# Patient Record
Sex: MALE | Race: WHITE
[De-identification: names, ages, dates, MRNs, and addresses within clinical notes are randomized per-mention and may not be internally consistent; named-entity substitution may affect disease eponyms.]

---

## 2018-04-24 ENCOUNTER — HOSPITAL ENCOUNTER (OUTPATIENT)
Dept: HOSPITAL 17 - PHSDC | Age: 43
Discharge: HOME | End: 2018-04-24
Attending: SURGERY
Payer: COMMERCIAL

## 2018-04-24 VITALS
SYSTOLIC BLOOD PRESSURE: 98 MMHG | OXYGEN SATURATION: 97 % | RESPIRATION RATE: 14 BRPM | DIASTOLIC BLOOD PRESSURE: 77 MMHG | HEART RATE: 103 BPM

## 2018-04-24 VITALS — HEART RATE: 89 BPM | TEMPERATURE: 98.1 F

## 2018-04-24 VITALS — HEART RATE: 117 BPM

## 2018-04-24 VITALS — WEIGHT: 220.46 LBS | BODY MASS INDEX: 25.51 KG/M2 | HEIGHT: 78 IN

## 2018-04-24 DIAGNOSIS — I10: ICD-10-CM

## 2018-04-24 DIAGNOSIS — D17.1: Primary | ICD-10-CM

## 2018-04-24 PROCEDURE — 88304 TISSUE EXAM BY PATHOLOGIST: CPT

## 2018-04-24 PROCEDURE — C9290 INJ, BUPIVACAINE LIPOSOME: HCPCS

## 2018-04-24 PROCEDURE — 00300 ANES ALL PX INTEG H/N/PTRUNK: CPT

## 2018-04-24 PROCEDURE — 11404 EXC TR-EXT B9+MARG 3.1-4 CM: CPT

## 2024-09-12 NOTE — PD.OP
cc:   Frankie Valdez MD


__________________________________________________





Operative Report


Date of Surgery:  Apr 24, 2018


Preoperative Diagnosis:  


Lipomas left lower back


Postoperative Diagnosis:  


Lipomas left lower back


Procedure:


Excision of lipomas left lower back


Anesthesia:


General endotracheal


Surgeon:


Frankie Valdez


Assistant(s):


None


Operation and Findings:


Operative procedure: The patient brought to the operating room and after 

satisfactory general endotracheal anesthesia obtained, the patient was 

positioned prone on the operating table with careful protection of all 

extremities and his neck.  The left lower back was prepped and draped in the 

usual sterile fashion.  1.3% Exparel was used to infiltrate the skin for local 

anesthesia.  A transverse skin incision was made over the palpable 

abnormalities.  His carried out sharply through the subcutaneous tissue with 

cautery being used for hemostasis.  Incision was deepened into the area of the 

lipoma which was grasped with Allis clamps and dissected free using the 

cautery.  The total area resected was 6 x 6 cm.  No other palpable 

abnormalities were able to be appreciated despite bimanual palpation from 

within the wound as well as externally.  Hemostasis was strictly assured after 

which the deep dermis and subcutaneous tissue were closed with interrupted 3-0 

Vicryl sutures and the and then closed with interrupted 4-0 PDS subcuticular 

stitches.  Steri strips and a compressive dressing were placed the patient was 

then awakened and taken from the operating room, in satisfactory condition, 

having tolerated the procedure without problem.  Estimated blood loss was 5 mL'

s.  The instrument, sponge, needle counts were reported as being correct 2 at 

the end of the procedure.











Frankie Valdez MD Apr 24, 2018 11:52
ED
ED